# Patient Record
Sex: FEMALE | URBAN - METROPOLITAN AREA
[De-identification: names, ages, dates, MRNs, and addresses within clinical notes are randomized per-mention and may not be internally consistent; named-entity substitution may affect disease eponyms.]

---

## 2019-02-13 ENCOUNTER — OFFICE VISIT (OUTPATIENT)
Dept: URBAN - METROPOLITAN AREA CLINIC 76 | Facility: CLINIC | Age: 54
End: 2019-02-13
Payer: COMMERCIAL

## 2019-02-13 DIAGNOSIS — H52.4 PRESBYOPIA: Primary | ICD-10-CM

## 2019-02-13 DIAGNOSIS — H25.13 AGE-RELATED NUCLEAR CATARACT, BILATERAL: ICD-10-CM

## 2019-02-13 PROCEDURE — 92310 CONTACT LENS FITTING OU: CPT | Performed by: OPTOMETRIST

## 2019-02-13 PROCEDURE — 92015 DETERMINE REFRACTIVE STATE: CPT | Performed by: OPTOMETRIST

## 2019-02-13 PROCEDURE — 92004 COMPRE OPH EXAM NEW PT 1/>: CPT | Performed by: OPTOMETRIST

## 2019-02-13 ASSESSMENT — VISUAL ACUITY
OD: 20/25
OS: 20/25

## 2019-02-13 ASSESSMENT — INTRAOCULAR PRESSURE
OS: 14
OD: 14

## 2019-02-13 ASSESSMENT — KERATOMETRY
OD: 42.38
OS: 42.50

## 2019-02-13 NOTE — IMPRESSION/PLAN
Impression: Presbyopia: H52.4. OU. Plan: Discussed diagnosis with patient. Dispensed new MRx today. Order trial ctls trials. If pt happy with comfort and vision w/ ctls, ok to finalize. Pt to call with any concerns. Monovision, OD distance and OS near.

## 2019-02-13 NOTE — IMPRESSION/PLAN
Impression: Age-related nuclear cataract, bilateral: H25.13. OU. trace PSC in OD due to the use of steroid medications. Plan: Observe for now.